# Patient Record
Sex: MALE | Race: OTHER | Employment: UNEMPLOYED | URBAN - METROPOLITAN AREA
[De-identification: names, ages, dates, MRNs, and addresses within clinical notes are randomized per-mention and may not be internally consistent; named-entity substitution may affect disease eponyms.]

---

## 2024-09-06 ENCOUNTER — HOSPITAL ENCOUNTER (EMERGENCY)
Facility: HOSPITAL | Age: 24
Discharge: HOME | End: 2024-09-06
Attending: EMERGENCY MEDICINE

## 2024-09-06 VITALS
WEIGHT: 160 LBS | DIASTOLIC BLOOD PRESSURE: 79 MMHG | HEART RATE: 92 BPM | RESPIRATION RATE: 20 BRPM | TEMPERATURE: 100 F | OXYGEN SATURATION: 95 % | SYSTOLIC BLOOD PRESSURE: 122 MMHG

## 2024-09-06 DIAGNOSIS — J02.0 STREP PHARYNGITIS: Primary | ICD-10-CM

## 2024-09-06 LAB
S PYO AG THROAT QL: POSITIVE
SARS-COV-2 RNA RESP QL NAA+PROBE: NOT DETECTED

## 2024-09-06 PROCEDURE — 99284 EMERGENCY DEPT VISIT MOD MDM: CPT

## 2024-09-06 PROCEDURE — 99283 EMERGENCY DEPT VISIT LOW MDM: CPT

## 2024-09-06 PROCEDURE — 87880 STREP A ASSAY W/OPTIC: CPT

## 2024-09-06 RX ORDER — IBUPROFEN 400 MG/1
400 TABLET, FILM COATED ORAL ONCE
Status: COMPLETED | OUTPATIENT
Start: 2024-09-06 | End: 2024-09-06

## 2024-09-06 RX ORDER — ACETAMINOPHEN 500 MG
1000 TABLET ORAL EVERY 6 HOURS PRN
Qty: 56 TABLET | Refills: 0 | Status: SHIPPED | OUTPATIENT
Start: 2024-09-06 | End: 2024-09-20

## 2024-09-06 RX ORDER — BENZONATATE 100 MG/1
100 CAPSULE ORAL 3 TIMES DAILY PRN
Qty: 30 CAPSULE | Refills: 0 | Status: SHIPPED | OUTPATIENT
Start: 2024-09-06 | End: 2024-10-06

## 2024-09-06 RX ORDER — ACETAMINOPHEN 500 MG
1000 TABLET ORAL ONCE
Status: COMPLETED | OUTPATIENT
Start: 2024-09-06 | End: 2024-09-06

## 2024-09-06 RX ORDER — BENZONATATE 200 MG/1
200 CAPSULE ORAL ONCE
Status: COMPLETED | OUTPATIENT
Start: 2024-09-06 | End: 2024-09-06

## 2024-09-06 RX ORDER — AMOXICILLIN 875 MG
875 TABLET ORAL 2 TIMES DAILY
Qty: 20 TABLET | Refills: 0 | Status: SHIPPED | OUTPATIENT
Start: 2024-09-06 | End: 2024-09-16

## 2024-09-06 RX ORDER — IBUPROFEN 200 MG
400 TABLET ORAL EVERY 6 HOURS PRN
Qty: 56 TABLET | Refills: 0 | Status: SHIPPED | OUTPATIENT
Start: 2024-09-06 | End: 2024-09-20

## 2024-09-07 ENCOUNTER — HOSPITAL ENCOUNTER (EMERGENCY)
Facility: HOSPITAL | Age: 24
Discharge: HOME | End: 2024-09-07
Attending: EMERGENCY MEDICINE

## 2024-09-07 ENCOUNTER — APPOINTMENT (OUTPATIENT)
Dept: GENERAL RADIOLOGY | Facility: HOSPITAL | Age: 24
End: 2024-09-07
Attending: EMERGENCY MEDICINE

## 2024-09-07 VITALS
OXYGEN SATURATION: 96 % | TEMPERATURE: 99 F | HEART RATE: 97 BPM | RESPIRATION RATE: 18 BRPM | SYSTOLIC BLOOD PRESSURE: 127 MMHG | WEIGHT: 160 LBS | DIASTOLIC BLOOD PRESSURE: 86 MMHG

## 2024-09-07 DIAGNOSIS — J06.9 VIRAL UPPER RESPIRATORY TRACT INFECTION WITH COUGH: Primary | ICD-10-CM

## 2024-09-07 DIAGNOSIS — J02.0 STREP PHARYNGITIS: ICD-10-CM

## 2024-09-07 LAB
ANION GAP SERPL CALC-SCNC: 11 MMOL/L (ref 0–18)
BASOPHILS # BLD AUTO: 0.03 X10(3) UL (ref 0–0.2)
BASOPHILS NFR BLD AUTO: 0.4 %
BUN BLD-MCNC: 10 MG/DL (ref 9–23)
BUN/CREAT SERPL: 11.6 (ref 10–20)
CALCIUM BLD-MCNC: 9.1 MG/DL (ref 8.7–10.4)
CHLORIDE SERPL-SCNC: 105 MMOL/L (ref 98–112)
CO2 SERPL-SCNC: 23 MMOL/L (ref 21–32)
CREAT BLD-MCNC: 0.86 MG/DL
DEPRECATED RDW RBC AUTO: 35.8 FL (ref 35.1–46.3)
EGFRCR SERPLBLD CKD-EPI 2021: 124 ML/MIN/1.73M2 (ref 60–?)
EOSINOPHIL # BLD AUTO: 0.08 X10(3) UL (ref 0–0.7)
EOSINOPHIL NFR BLD AUTO: 1.1 %
ERYTHROCYTE [DISTWIDTH] IN BLOOD BY AUTOMATED COUNT: 12 % (ref 11–15)
GLUCOSE BLD-MCNC: 97 MG/DL (ref 70–99)
HCT VFR BLD AUTO: 44.9 %
HGB BLD-MCNC: 15.9 G/DL
IMM GRANULOCYTES # BLD AUTO: 0.02 X10(3) UL (ref 0–1)
IMM GRANULOCYTES NFR BLD: 0.3 %
LYMPHOCYTES # BLD AUTO: 0.97 X10(3) UL (ref 1–4)
LYMPHOCYTES NFR BLD AUTO: 13.9 %
MCH RBC QN AUTO: 29.2 PG (ref 26–34)
MCHC RBC AUTO-ENTMCNC: 35.4 G/DL (ref 31–37)
MCV RBC AUTO: 82.4 FL
MONOCYTES # BLD AUTO: 0.78 X10(3) UL (ref 0.1–1)
MONOCYTES NFR BLD AUTO: 11.1 %
NEUTROPHILS # BLD AUTO: 5.12 X10 (3) UL (ref 1.5–7.7)
NEUTROPHILS # BLD AUTO: 5.12 X10(3) UL (ref 1.5–7.7)
NEUTROPHILS NFR BLD AUTO: 73.2 %
OSMOLALITY SERPL CALC.SUM OF ELEC: 287 MOSM/KG (ref 275–295)
PLATELET # BLD AUTO: 189 10(3)UL (ref 150–450)
POTASSIUM SERPL-SCNC: 3.9 MMOL/L (ref 3.5–5.1)
RBC # BLD AUTO: 5.45 X10(6)UL
SODIUM SERPL-SCNC: 139 MMOL/L (ref 136–145)
WBC # BLD AUTO: 7 X10(3) UL (ref 4–11)

## 2024-09-07 PROCEDURE — 85025 COMPLETE CBC W/AUTO DIFF WBC: CPT | Performed by: EMERGENCY MEDICINE

## 2024-09-07 PROCEDURE — 71045 X-RAY EXAM CHEST 1 VIEW: CPT | Performed by: EMERGENCY MEDICINE

## 2024-09-07 PROCEDURE — 80048 BASIC METABOLIC PNL TOTAL CA: CPT | Performed by: EMERGENCY MEDICINE

## 2024-09-07 PROCEDURE — 94640 AIRWAY INHALATION TREATMENT: CPT

## 2024-09-07 PROCEDURE — 96374 THER/PROPH/DIAG INJ IV PUSH: CPT

## 2024-09-07 PROCEDURE — 99285 EMERGENCY DEPT VISIT HI MDM: CPT

## 2024-09-07 PROCEDURE — 99284 EMERGENCY DEPT VISIT MOD MDM: CPT

## 2024-09-07 PROCEDURE — 96361 HYDRATE IV INFUSION ADD-ON: CPT

## 2024-09-07 RX ORDER — ALBUTEROL SULFATE 0.83 MG/ML
2.5 SOLUTION RESPIRATORY (INHALATION) ONCE
Status: COMPLETED | OUTPATIENT
Start: 2024-09-07 | End: 2024-09-07

## 2024-09-07 RX ORDER — ONDANSETRON 2 MG/ML
INJECTION INTRAMUSCULAR; INTRAVENOUS
Status: COMPLETED
Start: 2024-09-07 | End: 2024-09-07

## 2024-09-07 RX ORDER — ONDANSETRON 2 MG/ML
4 INJECTION INTRAMUSCULAR; INTRAVENOUS ONCE
Status: COMPLETED | OUTPATIENT
Start: 2024-09-07 | End: 2024-09-07

## 2024-09-07 RX ORDER — ALBUTEROL SULFATE 90 UG/1
2 AEROSOL, METERED RESPIRATORY (INHALATION) EVERY 4 HOURS PRN
Qty: 1 EACH | Refills: 0 | Status: SHIPPED | OUTPATIENT
Start: 2024-09-07 | End: 2024-10-07

## 2024-09-07 NOTE — ED INITIAL ASSESSMENT (HPI)
Pt to ED via EMS.  Per EMS, parents called d/t pt being, \"unconscious.\"  Upon arrival to pts house, EMS reporting pt breathing and in NAD, but refusing to answer any questions.  Per EMS, pt was seen here yesterday and dx w/ ear infection.  Upon arrival to ED, pt reporting generalized weakness and cough.

## 2024-09-07 NOTE — DISCHARGE INSTRUCTIONS
Take antibiotics for your throat  Drink plenty of fluids  May use inhaler 2 puffs every 4 hours for cough  Return if difficulty breathing

## 2024-09-07 NOTE — ED PROVIDER NOTES
Patient Seen in: [facility] Emergency Department      History     Chief Complaint   Patient presents with    Cough/URI     Stated Complaint: Other    Subjective:   HPI    The patient is a 24-year-old male who presents with 5 days of cough congestion body aches fatigue sore throat.  He was seen in the ER yesterday diagnosed with strep throat.  Started on antibiotics.  Last night he had 1 episode of vomiting.  He has not been eating because he does not have an appetite.  Today he was extremely weak so his parents called 911.  No shortness of breath.  No abdominal pain    Objective:   No pertinent past medical history.            No pertinent past surgical history.              No pertinent social history.            Review of Systems    Positive for stated Chief Complaint: Cough/URI    Other systems are as noted in HPI.  Constitutional and vital signs reviewed.      All other systems reviewed and negative except as noted above.    Physical Exam     ED Triage Vitals [09/07/24 0910]   /85   Pulse 99   Resp 20   Temp 98.9 °F (37.2 °C)   Temp src Temporal   SpO2 95 %   O2 Device None (Room air)       Current Vitals:   Vital Signs  BP: 127/86  Pulse: 97  Resp: 18  Temp: 98.9 °F (37.2 °C)  Temp src: Temporal  MAP (mmHg): 96    Oxygen Therapy  SpO2: 96 %  O2 Device: None (Room air)            Physical Exam  Vitals and nursing note reviewed.   Constitutional:       General: He is not in acute distress.     Appearance: Normal appearance. He is well-developed. He is ill-appearing.   HENT:      Head: Normocephalic and atraumatic.      Nose: Congestion present.      Mouth/Throat:      Mouth: Mucous membranes are moist.   Eyes:      Conjunctiva/sclera: Conjunctivae normal.      Pupils: Pupils are equal, round, and reactive to light.   Neck:      Vascular: No JVD.   Cardiovascular:      Rate and Rhythm: Normal rate and regular rhythm.      Heart sounds: Normal heart sounds. No murmur heard.  Pulmonary:      Effort:  Pulmonary effort is normal.      Breath sounds: Wheezing and rhonchi present.   Abdominal:      General: Bowel sounds are normal. There is no distension.      Palpations: Abdomen is soft. There is no mass.      Tenderness: There is no abdominal tenderness. There is no guarding or rebound.   Musculoskeletal:         General: Normal range of motion.      Cervical back: Normal range of motion and neck supple. No tenderness.      Right lower leg: No edema.      Left lower leg: No edema.   Lymphadenopathy:      Cervical: No cervical adenopathy.   Skin:     General: Skin is warm and dry.      Capillary Refill: Capillary refill takes less than 2 seconds.      Findings: No rash.   Neurological:      General: No focal deficit present.      Mental Status: He is alert and oriented to person, place, and time.      Deep Tendon Reflexes: Reflexes are normal and symmetric.   Psychiatric:         Judgment: Judgment normal.     Differential diagnosis includes viral syndrome, pneumonia, strep, dehydration          ED Course     Labs Reviewed   CBC WITH DIFFERENTIAL WITH PLATELET - Abnormal; Notable for the following components:       Result Value    Lymphocyte Absolute 0.97 (*)     All other components within normal limits   BASIC METABOLIC PANEL (8) - Normal                      MDM      Pulse ox 96% on room air, normal                                   Medical Decision Making  Patient feels much better after IV fluids and nebulizer  Cough improved  Lungs with rhonchi but no wheezing  Normal oxygenation  Tolerating p.o.  Home with albuterol continue antibiotics as prescribed yesterday for strep, alert and oriented x 4    Problems Addressed:  Strep pharyngitis: acute illness or injury  Viral upper respiratory tract infection with cough: acute illness or injury    Amount and/or Complexity of Data Reviewed  Independent Historian: EMS  Labs: ordered.  Radiology: ordered and independent interpretation performed.     Details: No  infiltrate other results per radiologist    Risk  Prescription drug management.  Risk Details: Dosage and side effect discussed with patient        Disposition and Plan     Clinical Impression:  1. Viral upper respiratory tract infection with cough    2. Strep pharyngitis         Disposition:  Discharge  9/7/2024 11:22 am    Follow-up:  Adali Perry MD  [address] [address]  [phone]    Schedule an appointment as soon as possible for a visit  If symptoms worsen          Medications Prescribed:  Current Discharge Medication List        START taking these medications    Details   albuterol 108 (90 Base) MCG/ACT Inhalation Aero Soln Inhale 2 puffs into the lungs every 4 (four) hours as needed for Wheezing.  Qty: 1 each, Refills: 0

## 2024-09-09 ENCOUNTER — APPOINTMENT (OUTPATIENT)
Dept: GENERAL RADIOLOGY | Facility: HOSPITAL | Age: 24
End: 2024-09-09
Attending: EMERGENCY MEDICINE

## 2024-09-09 ENCOUNTER — HOSPITAL ENCOUNTER (EMERGENCY)
Facility: HOSPITAL | Age: 24
Discharge: HOME | End: 2024-09-09
Attending: EMERGENCY MEDICINE

## 2024-09-09 VITALS
DIASTOLIC BLOOD PRESSURE: 73 MMHG | TEMPERATURE: 99 F | HEART RATE: 91 BPM | SYSTOLIC BLOOD PRESSURE: 119 MMHG | OXYGEN SATURATION: 96 % | RESPIRATION RATE: 20 BRPM

## 2024-09-09 DIAGNOSIS — J18.9 COMMUNITY ACQUIRED PNEUMONIA OF RIGHT LOWER LOBE OF LUNG: Primary | ICD-10-CM

## 2024-09-09 LAB
ANION GAP SERPL CALC-SCNC: 9 MMOL/L (ref 0–18)
BASOPHILS # BLD AUTO: 0.03 X10(3) UL (ref 0–0.2)
BASOPHILS NFR BLD AUTO: 0.4 %
BUN BLD-MCNC: 9 MG/DL (ref 9–23)
BUN/CREAT SERPL: 10.8 (ref 10–20)
CALCIUM BLD-MCNC: 9.1 MG/DL (ref 8.7–10.4)
CHLORIDE SERPL-SCNC: 107 MMOL/L (ref 98–112)
CO2 SERPL-SCNC: 25 MMOL/L (ref 21–32)
CREAT BLD-MCNC: 0.83 MG/DL
DEPRECATED RDW RBC AUTO: 35.8 FL (ref 35.1–46.3)
EGFRCR SERPLBLD CKD-EPI 2021: 125 ML/MIN/1.73M2 (ref 60–?)
EOSINOPHIL # BLD AUTO: 0.2 X10(3) UL (ref 0–0.7)
EOSINOPHIL NFR BLD AUTO: 2.5 %
ERYTHROCYTE [DISTWIDTH] IN BLOOD BY AUTOMATED COUNT: 11.9 % (ref 11–15)
FLUAV + FLUBV RNA SPEC NAA+PROBE: NEGATIVE
FLUAV + FLUBV RNA SPEC NAA+PROBE: NEGATIVE
GLUCOSE BLD-MCNC: 105 MG/DL (ref 70–99)
HCT VFR BLD AUTO: 46.1 %
HGB BLD-MCNC: 16.3 G/DL
IMM GRANULOCYTES # BLD AUTO: 0.01 X10(3) UL (ref 0–1)
IMM GRANULOCYTES NFR BLD: 0.1 %
LACTATE SERPL-SCNC: 0.8 MMOL/L (ref 0.5–2)
LYMPHOCYTES # BLD AUTO: 1.19 X10(3) UL (ref 1–4)
LYMPHOCYTES NFR BLD AUTO: 14.6 %
MCH RBC QN AUTO: 29.2 PG (ref 26–34)
MCHC RBC AUTO-ENTMCNC: 35.4 G/DL (ref 31–37)
MCV RBC AUTO: 82.6 FL
MONOCYTES # BLD AUTO: 0.67 X10(3) UL (ref 0.1–1)
MONOCYTES NFR BLD AUTO: 8.2 %
NEUTROPHILS # BLD AUTO: 6.05 X10 (3) UL (ref 1.5–7.7)
NEUTROPHILS # BLD AUTO: 6.05 X10(3) UL (ref 1.5–7.7)
NEUTROPHILS NFR BLD AUTO: 74.2 %
OSMOLALITY SERPL CALC.SUM OF ELEC: 291 MOSM/KG (ref 275–295)
PLATELET # BLD AUTO: 275 10(3)UL (ref 150–450)
POTASSIUM SERPL-SCNC: 3.9 MMOL/L (ref 3.5–5.1)
RBC # BLD AUTO: 5.58 X10(6)UL
RSV RNA SPEC NAA+PROBE: NEGATIVE
SARS-COV-2 RNA RESP QL NAA+PROBE: NOT DETECTED
SODIUM SERPL-SCNC: 141 MMOL/L (ref 136–145)
WBC # BLD AUTO: 8.2 X10(3) UL (ref 4–11)

## 2024-09-09 PROCEDURE — 99284 EMERGENCY DEPT VISIT MOD MDM: CPT

## 2024-09-09 PROCEDURE — 80048 BASIC METABOLIC PNL TOTAL CA: CPT | Performed by: EMERGENCY MEDICINE

## 2024-09-09 PROCEDURE — 87040 BLOOD CULTURE FOR BACTERIA: CPT | Performed by: EMERGENCY MEDICINE

## 2024-09-09 PROCEDURE — 94640 AIRWAY INHALATION TREATMENT: CPT

## 2024-09-09 PROCEDURE — 71045 X-RAY EXAM CHEST 1 VIEW: CPT | Performed by: EMERGENCY MEDICINE

## 2024-09-09 PROCEDURE — 0241U SARS-COV-2/FLU A AND B/RSV BY PCR (GENEXPERT): CPT | Performed by: EMERGENCY MEDICINE

## 2024-09-09 PROCEDURE — 85025 COMPLETE CBC W/AUTO DIFF WBC: CPT | Performed by: EMERGENCY MEDICINE

## 2024-09-09 PROCEDURE — 36415 COLL VENOUS BLD VENIPUNCTURE: CPT

## 2024-09-09 PROCEDURE — 83605 ASSAY OF LACTIC ACID: CPT | Performed by: EMERGENCY MEDICINE

## 2024-09-09 RX ORDER — IPRATROPIUM BROMIDE AND ALBUTEROL SULFATE 2.5; .5 MG/3ML; MG/3ML
3 SOLUTION RESPIRATORY (INHALATION) ONCE
Status: COMPLETED | OUTPATIENT
Start: 2024-09-09 | End: 2024-09-09

## 2024-09-09 RX ORDER — AZITHROMYCIN 250 MG/1
TABLET, FILM COATED ORAL
Qty: 6 TABLET | Refills: 0 | Status: SHIPPED | OUTPATIENT
Start: 2024-09-09 | End: 2024-09-14

## 2024-09-10 NOTE — DISCHARGE INSTRUCTIONS
Stop amoxicillin.  Take new antibiotics as prescribed.  You may continue other medications including inhaler as previously prescribed.  Follow-up with a primary care physician.  Return to the emergency department if increased difficulty breathing or other new symptoms develop.

## 2024-09-10 NOTE — ED QUICK NOTES
Assumed care of patient.  Rounding Completed    Plan of Care reviewed. Waiting for neb trx to end.  Elimination needs assessed.  Provided RN introduction.    Bed is locked and in lowest position. Call light within reach.

## 2024-09-10 NOTE — ED INITIAL ASSESSMENT (HPI)
Pt arrives via EMS from home c/o cough and SHRUTHI. Pt was dx with pneumonia 2 weeks ago, completed abx, states symptoms have not improved.   EMS noted diminished R lung.

## 2024-09-10 NOTE — ED QUICK NOTES
Patient safe to be discharged home per provider. Discharge education given via printed AVS. Reviewed: follow up care with establishing a new PCP, new and old medications and when to seek emergency care. Patient verbalizes understanding and is able to teach back. Patient ambulated to exit with brothers.

## 2024-09-10 NOTE — ED PROVIDER NOTES
Patient Seen in: [facility] Emergency Department      History     Chief Complaint   Patient presents with    Cough/URI     Stated Complaint: cough    Subjective:   HPI    24-year-old male without significant past medical history presents with complaints of cough, difficulty breathing, losing his voice, and posttussive emesis.  The patient has had symptoms over the past 4 days.  He was seen in this emergency department on 9/6 and was diagnosed with strep pharyngitis.  He was discharged home with prescriptions for acetaminophen, ibuprofen, benzonatate, and amoxicillin.  He then returned the following day after 1 episode of emesis.  He had laboratory studies and a chest x-ray done.  He was diagnosed with a viral respiratory tract infection along with strep pharyngitis.  He was given an albuterol inhaler.  He returns today stating that he is not getting any better he continues to have cough and occasional posttussive emesis.  The patient also reports that he is losing his voice.    Objective:   History reviewed. No pertinent past medical history.           History reviewed. No pertinent surgical history.             Social History     Socioeconomic History    Marital status: Single   Tobacco Use    Smoking status: Never    Smokeless tobacco: Never              Review of Systems    Positive for stated Chief Complaint: Cough/URI    Other systems are as noted in HPI.  Constitutional and vital signs reviewed.      All other systems reviewed and negative except as noted above.    Physical Exam     ED Triage Vitals [09/09/24 2050]   /76   Pulse 92   Resp 16   Temp 99.7 °F (37.6 °C)   Temp src Oral   SpO2 97 %   O2 Device None (Room air)       Current Vitals:   Vital Signs  BP: 110/74  Pulse: 90  Resp: 19  Temp: 99 °F (37.2 °C)  Temp src: Oral  MAP (mmHg): 86    Oxygen Therapy  SpO2: 96 %  O2 Device: None (Room air)            Physical Exam      General Appearance: alert, no distress  Eyes: pupils equal and  round no pallor or injection  ENT, Mouth: mucous membranes moist.  Bilateral TMs and auditory canals normal-appearing.  Oropharynx normal-appearing.  No erythema or exudate noted.  Uvula midline.  No asymmetry noted.  Respiratory: there are no retractions, lungs are clear to auscultation.  Coughing during examination.  Cardiovascular: regular rate and rhythm  Gastrointestinal:  abdomen is soft and non tender, no masses, bowel sounds normal  Neurological: Speech normal.  Moving extremities x 4.  Skin: warm and dry, no rashes.  Musculoskeletal: neck is supple non tender        Extremities are symmetrical, full range of motion  Psychiatric: patient is oriented X 3, there is no agitation    DIFFERENTIAL DIAGNOSIS: After history and physical exam differential diagnosis was considered for viral respiratory illness, pneumonia, or other        ED Course     Labs Reviewed   BASIC METABOLIC PANEL (8) - Abnormal; Notable for the following components:       Result Value    Glucose 105 (*)     All other components within normal limits   LACTIC ACID, PLASMA - Normal   SARS-COV-2/FLU A AND B/RSV BY PCR (GENEXPERT) - Normal    Narrative:     This test is intended for the qualitative detection and differentiation of SARS-CoV-2, influenza A, influenza B, and respiratory syncytial virus (RSV) viral RNA in nasopharyngeal or nares swabs from individuals suspected of respiratory viral infection consistent with COVID-19 by their healthcare provider. Signs and symptoms of respiratory viral infection due to SARS-CoV-2, influenza, and RSV can be similar.    Test performed using the Xpert Xpress SARS-CoV-2/FLU/RSV (real time RT-PCR)  assay on the GeneXpert instrument, [employer], [place] [address].   This test is being used under the Food and Drug Administration's Emergency Use Authorization.    The authorized Fact Sheet for Healthcare Providers for this assay is available upon request from the laboratory.   CBC WITH DIFFERENTIAL WITH PLATELET    BLOOD CULTURE   BLOOD CULTURE                    MDM      I reveiewed x-ray and agree with the radiologist report that showed right lower lobe pneumonia.  Lab results noted.  O2 saturation 98% on room air.  Patient comfortable appearing.  Will have the patient discontinue amoxicillin and will give a course of Augmentin along with azithromycin for treatment of community-acquired pneumonia.  He is advised to follow-up with a primary care physician for reevaluation.  He is advised to return if difficulty breathing or repeated vomiting develops.                                   Medical Decision Making      Disposition and Plan     Clinical Impression:  1. Community acquired pneumonia of right lower lobe of lung         Disposition:  Discharge  9/9/2024 11:36 pm    Follow-up:  Adali Perry MD  [address] [id number] [phone]    Follow up            Medications Prescribed:  Current Discharge Medication List        START taking these medications    Details   amoxicillin clavulanate 875-125 MG Oral Tab Take 1 tablet by mouth 2 (two) times daily for 10 days.  Qty: 20 tablet, Refills: 0      azithromycin (ZITHROMAX Z-CARINE) 250 MG Oral Tab 500 mg once followed by 250 mg daily x 4 days  Qty: 6 tablet, Refills: 0

## (undated) NOTE — LETTER
Date & Time: 9/7/2024, 11:24 AM  Patient: Se Zimmerman  Encounter Provider(s):    Evette Collazo MD       To Whom It May Concern:    Se Zimmerman was seen and treated in our department on 9/7/2024. He should not return to work until 9/10/24 .    If you have any questions or concerns, please do not hesitate to call.        _____________________________  Physician/APC Signature